# Patient Record
Sex: MALE | Race: WHITE | Employment: FULL TIME | URBAN - METROPOLITAN AREA
[De-identification: names, ages, dates, MRNs, and addresses within clinical notes are randomized per-mention and may not be internally consistent; named-entity substitution may affect disease eponyms.]

---

## 2017-10-25 ENCOUNTER — ANESTHESIA EVENT (OUTPATIENT)
Dept: OPERATING ROOM | Age: 37
End: 2017-10-25
Payer: COMMERCIAL

## 2017-10-26 ENCOUNTER — ANESTHESIA (OUTPATIENT)
Dept: OPERATING ROOM | Age: 37
End: 2017-10-26
Payer: COMMERCIAL

## 2017-10-26 ENCOUNTER — HOSPITAL ENCOUNTER (OUTPATIENT)
Age: 37
Setting detail: OUTPATIENT SURGERY
Discharge: HOME | End: 2017-10-26
Attending: OPHTHALMOLOGY | Admitting: OPHTHALMOLOGY
Payer: COMMERCIAL

## 2017-10-26 VITALS — SYSTOLIC BLOOD PRESSURE: 122 MMHG | OXYGEN SATURATION: 98 % | DIASTOLIC BLOOD PRESSURE: 62 MMHG | TEMPERATURE: 97.5 F

## 2017-10-26 VITALS
WEIGHT: 199 LBS | BODY MASS INDEX: 28.49 KG/M2 | DIASTOLIC BLOOD PRESSURE: 72 MMHG | HEIGHT: 70 IN | SYSTOLIC BLOOD PRESSURE: 128 MMHG | OXYGEN SATURATION: 97 % | RESPIRATION RATE: 18 BRPM | HEART RATE: 71 BPM | TEMPERATURE: 97.9 F

## 2017-10-26 PROBLEM — H33.023 RETINAL DETACHMENT OF BOTH EYES WITH MULTIPLE BREAKS: Chronic | Status: ACTIVE | Noted: 2017-10-26

## 2017-10-26 PROCEDURE — 3700000000 HC ANESTHESIA ATTENDED CARE: Performed by: OPHTHALMOLOGY

## 2017-10-26 PROCEDURE — 7100000010 HC PHASE II RECOVERY - FIRST 15 MIN: Performed by: OPHTHALMOLOGY

## 2017-10-26 PROCEDURE — 2580000003 HC RX 258: Performed by: ANESTHESIOLOGY

## 2017-10-26 PROCEDURE — 2780000010 HC IMPLANT OTHER: Performed by: OPHTHALMOLOGY

## 2017-10-26 PROCEDURE — 7100000001 HC PACU RECOVERY - ADDTL 15 MIN: Performed by: OPHTHALMOLOGY

## 2017-10-26 PROCEDURE — 7100000000 HC PACU RECOVERY - FIRST 15 MIN: Performed by: OPHTHALMOLOGY

## 2017-10-26 PROCEDURE — 2500000003 HC RX 250 WO HCPCS: Performed by: OPHTHALMOLOGY

## 2017-10-26 PROCEDURE — 3600000014 HC SURGERY LEVEL 4 ADDTL 15MIN: Performed by: OPHTHALMOLOGY

## 2017-10-26 PROCEDURE — 6370000000 HC RX 637 (ALT 250 FOR IP): Performed by: OPHTHALMOLOGY

## 2017-10-26 PROCEDURE — 2500000003 HC RX 250 WO HCPCS: Performed by: NURSE ANESTHETIST, CERTIFIED REGISTERED

## 2017-10-26 PROCEDURE — 3600000004 HC SURGERY LEVEL 4 BASE: Performed by: OPHTHALMOLOGY

## 2017-10-26 PROCEDURE — 6360000002 HC RX W HCPCS: Performed by: NURSE ANESTHETIST, CERTIFIED REGISTERED

## 2017-10-26 PROCEDURE — 3700000001 HC ADD 15 MINUTES (ANESTHESIA): Performed by: OPHTHALMOLOGY

## 2017-10-26 PROCEDURE — 6360000002 HC RX W HCPCS: Performed by: OPHTHALMOLOGY

## 2017-10-26 DEVICE — BUCKLE SCLER RETINAL 2.5 MM EYE TIRE STYL 276: Type: IMPLANTABLE DEVICE | Status: FUNCTIONAL

## 2017-10-26 DEVICE — IMPLANTABLE DEVICE
Type: IMPLANTABLE DEVICE | Status: FUNCTIONAL
Brand: CIRCLING BAND

## 2017-10-26 DEVICE — SLEEVE SCLER BCKL L30IN OD2.4IN ID1.5IN SIL STYL 72: Type: IMPLANTABLE DEVICE | Status: FUNCTIONAL

## 2017-10-26 RX ORDER — ERYTHROMYCIN 5 MG/G
OINTMENT OPHTHALMIC PRN
Status: DISCONTINUED | OUTPATIENT
Start: 2017-10-26 | End: 2017-10-26 | Stop reason: HOSPADM

## 2017-10-26 RX ORDER — GENTAMICIN SULFATE 40 MG/ML
INJECTION, SOLUTION INTRAMUSCULAR; INTRAVENOUS PRN
Status: DISCONTINUED | OUTPATIENT
Start: 2017-10-26 | End: 2017-10-26 | Stop reason: HOSPADM

## 2017-10-26 RX ORDER — LIDOCAINE HYDROCHLORIDE 10 MG/ML
INJECTION, SOLUTION EPIDURAL; INFILTRATION; INTRACAUDAL; PERINEURAL PRN
Status: DISCONTINUED | OUTPATIENT
Start: 2017-10-26 | End: 2017-10-26 | Stop reason: SDUPTHER

## 2017-10-26 RX ORDER — ONDANSETRON 2 MG/ML
INJECTION INTRAMUSCULAR; INTRAVENOUS PRN
Status: DISCONTINUED | OUTPATIENT
Start: 2017-10-26 | End: 2017-10-26 | Stop reason: SDUPTHER

## 2017-10-26 RX ORDER — DEXAMETHASONE SODIUM PHOSPHATE 10 MG/ML
INJECTION INTRAMUSCULAR; INTRAVENOUS PRN
Status: DISCONTINUED | OUTPATIENT
Start: 2017-10-26 | End: 2017-10-26 | Stop reason: SDUPTHER

## 2017-10-26 RX ORDER — GLYCOPYRROLATE 0.2 MG/ML
INJECTION INTRAMUSCULAR; INTRAVENOUS PRN
Status: DISCONTINUED | OUTPATIENT
Start: 2017-10-26 | End: 2017-10-26 | Stop reason: SDUPTHER

## 2017-10-26 RX ORDER — SODIUM CHLORIDE, SODIUM LACTATE, POTASSIUM CHLORIDE, CALCIUM CHLORIDE 600; 310; 30; 20 MG/100ML; MG/100ML; MG/100ML; MG/100ML
INJECTION, SOLUTION INTRAVENOUS CONTINUOUS
Status: DISCONTINUED | OUTPATIENT
Start: 2017-10-26 | End: 2017-10-26 | Stop reason: HOSPADM

## 2017-10-26 RX ORDER — CYCLOPENTOLATE HYDROCHLORIDE 10 MG/ML
1 SOLUTION/ DROPS OPHTHALMIC EVERY 5 MIN PRN
Status: DISCONTINUED | OUTPATIENT
Start: 2017-10-26 | End: 2017-10-26 | Stop reason: HOSPADM

## 2017-10-26 RX ORDER — PROPOFOL 10 MG/ML
INJECTION, EMULSION INTRAVENOUS PRN
Status: DISCONTINUED | OUTPATIENT
Start: 2017-10-26 | End: 2017-10-26 | Stop reason: SDUPTHER

## 2017-10-26 RX ORDER — MIDAZOLAM HYDROCHLORIDE 1 MG/ML
INJECTION INTRAMUSCULAR; INTRAVENOUS PRN
Status: DISCONTINUED | OUTPATIENT
Start: 2017-10-26 | End: 2017-10-26 | Stop reason: SDUPTHER

## 2017-10-26 RX ORDER — PHENYLEPHRINE HYDROCHLORIDE 100 MG/ML
1 SOLUTION/ DROPS OPHTHALMIC EVERY 5 MIN PRN
Status: DISCONTINUED | OUTPATIENT
Start: 2017-10-26 | End: 2017-10-26 | Stop reason: HOSPADM

## 2017-10-26 RX ORDER — ROCURONIUM BROMIDE 10 MG/ML
INJECTION, SOLUTION INTRAVENOUS PRN
Status: DISCONTINUED | OUTPATIENT
Start: 2017-10-26 | End: 2017-10-26 | Stop reason: SDUPTHER

## 2017-10-26 RX ORDER — FENTANYL CITRATE 50 UG/ML
INJECTION, SOLUTION INTRAMUSCULAR; INTRAVENOUS PRN
Status: DISCONTINUED | OUTPATIENT
Start: 2017-10-26 | End: 2017-10-26 | Stop reason: SDUPTHER

## 2017-10-26 RX ORDER — BUPIVACAINE HYDROCHLORIDE 7.5 MG/ML
INJECTION, SOLUTION EPIDURAL; RETROBULBAR PRN
Status: DISCONTINUED | OUTPATIENT
Start: 2017-10-26 | End: 2017-10-26 | Stop reason: HOSPADM

## 2017-10-26 RX ORDER — FENTANYL CITRATE 50 UG/ML
25 INJECTION, SOLUTION INTRAMUSCULAR; INTRAVENOUS EVERY 5 MIN PRN
Status: CANCELLED | OUTPATIENT
Start: 2017-10-26

## 2017-10-26 RX ORDER — TOBRAMYCIN AND DEXAMETHASONE 3; 1 MG/ML; MG/ML
1 SUSPENSION/ DROPS OPHTHALMIC ONCE
Status: DISCONTINUED | OUTPATIENT
Start: 2017-10-26 | End: 2017-10-26 | Stop reason: HOSPADM

## 2017-10-26 RX ORDER — ESMOLOL HYDROCHLORIDE 10 MG/ML
INJECTION INTRAVENOUS PRN
Status: DISCONTINUED | OUTPATIENT
Start: 2017-10-26 | End: 2017-10-26 | Stop reason: SDUPTHER

## 2017-10-26 RX ORDER — GENTAMICIN SULFATE 3 MG/ML
SOLUTION/ DROPS OPHTHALMIC PRN
Status: DISCONTINUED | OUTPATIENT
Start: 2017-10-26 | End: 2017-10-26 | Stop reason: HOSPADM

## 2017-10-26 RX ADMIN — ESMOLOL HYDROCHLORIDE 20 MG: 10 INJECTION INTRAVENOUS at 12:25

## 2017-10-26 RX ADMIN — ONDANSETRON 4 MG: 2 INJECTION, SOLUTION INTRAMUSCULAR; INTRAVENOUS at 12:19

## 2017-10-26 RX ADMIN — LIDOCAINE HYDROCHLORIDE 50 MG: 10 INJECTION, SOLUTION EPIDURAL; INFILTRATION; INTRACAUDAL; PERINEURAL at 11:00

## 2017-10-26 RX ADMIN — DEXAMETHASONE SODIUM PHOSPHATE 10 MG: 10 INJECTION INTRAMUSCULAR; INTRAVENOUS at 11:10

## 2017-10-26 RX ADMIN — SODIUM CHLORIDE, POTASSIUM CHLORIDE, SODIUM LACTATE AND CALCIUM CHLORIDE: 600; 310; 30; 20 INJECTION, SOLUTION INTRAVENOUS at 10:56

## 2017-10-26 RX ADMIN — PHENYLEPHRINE HYDROCHLORIDE 1 DROP: 100 SOLUTION/ DROPS OPHTHALMIC at 10:01

## 2017-10-26 RX ADMIN — ONDANSETRON 4 MG: 2 INJECTION, SOLUTION INTRAMUSCULAR; INTRAVENOUS at 11:10

## 2017-10-26 RX ADMIN — PROPOFOL 200 MG: 10 INJECTION, EMULSION INTRAVENOUS at 11:00

## 2017-10-26 RX ADMIN — ROCURONIUM BROMIDE 40 MG: 10 INJECTION INTRAVENOUS at 11:00

## 2017-10-26 RX ADMIN — MIDAZOLAM HYDROCHLORIDE 1 MG: 1 INJECTION, SOLUTION INTRAMUSCULAR; INTRAVENOUS at 11:00

## 2017-10-26 RX ADMIN — CYCLOPENTOLATE HYDROCHLORIDE 1 DROP: 10 SOLUTION/ DROPS OPHTHALMIC at 09:53

## 2017-10-26 RX ADMIN — FENTANYL CITRATE 100 MCG: 50 INJECTION INTRAMUSCULAR; INTRAVENOUS at 11:00

## 2017-10-26 RX ADMIN — CYCLOPENTOLATE HYDROCHLORIDE 1 DROP: 10 SOLUTION/ DROPS OPHTHALMIC at 10:01

## 2017-10-26 RX ADMIN — GLYCOPYRROLATE 0.4 MG: 0.2 INJECTION, SOLUTION INTRAMUSCULAR; INTRAVENOUS at 12:16

## 2017-10-26 RX ADMIN — PHENYLEPHRINE HYDROCHLORIDE 1 DROP: 100 SOLUTION/ DROPS OPHTHALMIC at 09:53

## 2017-10-26 RX ADMIN — NEOSTIGMINE METHYLSULFATE 3 MG: 1 INJECTION, SOLUTION INTRAMUSCULAR; INTRAVENOUS; SUBCUTANEOUS at 12:16

## 2017-10-26 RX ADMIN — MIDAZOLAM HYDROCHLORIDE 1 MG: 1 INJECTION, SOLUTION INTRAMUSCULAR; INTRAVENOUS at 10:56

## 2017-10-26 ASSESSMENT — PAIN DESCRIPTION - PAIN TYPE
TYPE: SURGICAL PAIN

## 2017-10-26 ASSESSMENT — PAIN DESCRIPTION - ORIENTATION
ORIENTATION: RIGHT

## 2017-10-26 ASSESSMENT — PAIN SCALES - WONG BAKER
WONGBAKER_NUMERICALRESPONSE: 0

## 2017-10-26 ASSESSMENT — PAIN DESCRIPTION - LOCATION
LOCATION: EYE

## 2017-10-26 ASSESSMENT — PAIN - FUNCTIONAL ASSESSMENT
PAIN_FUNCTIONAL_ASSESSMENT: 0-10
PAIN_FUNCTIONAL_ASSESSMENT: 0-10

## 2017-10-26 ASSESSMENT — PAIN SCALES - GENERAL
PAINLEVEL_OUTOF10: 2

## 2017-10-26 NOTE — PROGRESS NOTES
1330 wife at bedside. 1340 discharge instructions reviewed. All questions answered.  Wife verbalizes understanding

## 2017-10-26 NOTE — ANESTHESIA PRE PROCEDURE
Department of Anesthesiology  Preprocedure Note       Name:  Shivam Drake   Age:  44 y.o.  :  1980                                          MRN:  [id number]         Date:  10/26/2017      Surgeon: Estefani Jones):  Tisha Ahumada MD    Procedure: Procedure(s):  SCLERAL BUCKLE RIGHT EYE, INDIRECT LASER LEFT EYE    Medications prior to admission:   Prior to Admission medications    Not on File       Current medications:    No current facility-administered medications for this encounter. Allergies: Allergies not on file    Problem List:  There is no problem list on file for this patient. Past Medical History:  No past medical history on file. Past Surgical History:  No past surgical history on file. Social History:    Social History   Substance Use Topics    Smoking status: Not on file    Smokeless tobacco: Not on file    Alcohol use Not on file                                Counseling given: Not Answered      Vital Signs (Current): There were no vitals filed for this visit. BP Readings from Last 3 Encounters:   No data found for BP       NPO Status:                                                                                 BMI:   Wt Readings from Last 3 Encounters:   No data found for Wt     There is no height or weight on file to calculate BMI.    CBC: No results found for: WBC, RBC, HGB, HCT, MCV, RDW, PLT    CMP: No results found for: NA, K, CL, CO2, BUN, CREATININE, GFRAA, AGRATIO, LABGLOM, GLUCOSE, PROT, CALCIUM, BILITOT, ALKPHOS, AST, ALT    POC Tests: No results for input(s): POCGLU, POCNA, POCK, POCCL, POCBUN, POCHEMO, POCHCT in the last 72 hours.     Coags: No results found for: PROTIME, INR, APTT    HCG (If Applicable): No results found for: PREGTESTUR, PREGSERUM, HCG, HCGQUANT     ABGs: No results found for: PHART, PO2ART, PCT2FNU, HSL4HSG, BEART, B8XGEYLD     Type & Screen (If Applicable):  No results found for: LABABO, [address]    Anesthesia Evaluation  Patient summary reviewed and Nursing notes reviewed no history of anesthetic complications:   Airway: Mallampati: II  TM distance: >3 FB   Neck ROM: full  Mouth opening: > = 3 FB Dental: normal exam         Pulmonary:negative ROS and normal exam                               Cardiovascular:  Exercise tolerance: good (>4 METS),       (-) past MI, CAD, CABG/stent and dysrhythmias                Neuro/Psych:   {neg ROS              GI/Hepatic/Renal:   (+) GERD: well controlled,      (-) liver disease       Endo/Other:        (-) hypothyroidism, hyperthyroidism, blood dyscrasia, arthritis, no Type II DM               Abdominal:           Vascular:                                    Anesthesia Plan      general     ASA 2     (GETA)  Induction: intravenous. MIPS: Prophylactic antiemetics administered. Anesthetic plan and risks discussed with patient. Plan discussed with CRNA.                 Arash Hylton MD   10/26/2017

## 2017-10-26 NOTE — BRIEF OP NOTE
Brief Postoperative Note  ______________________________________________________________    Patient: Cristine Ott  YOB: 1980  MRN: [id number]  Date of Procedure: 10/26/2017    Pre-Op Diagnosis: RETINAL DETACHMENT Both Eyes    Post-Op Diagnosis: Same       Procedure(s):  SCLERAL BUCKLE,CRYO - RIGHT EYE; INDIRECT LASER - LEFT EYE    Anesthesia: General    Surgeon(s):  Mari Ty MD    Staff:  Scrub Person First: George Ko     Estimated Blood Loss: 5 ml)    Complications:    Specimens:       Implants:    Implant Name Type Inv.  Item Serial No.  Lot No. LRB No. Used   IMPL EYE TIRE SCLERAL BCKL 276 2.5MM Eye IMPL EYE TIRE SCLERAL BCKL 276 2.5MM  [employer] [id number] N/A 1   IMPL EYE SLV SCLERAL BCKL TEDDY 72 2.4MM Eye IMPL EYE SLV SCLERAL BCKL TEDDY 72 2.4MM  [facility] [employer] [place] [id number] N/A 1   IMPL EYE BND SCLERAL BCKL  2.5MM Eye IMPL EYE BND SCLERAL BCKL  2.5MM   [employer] [id number] N/A 1         Drains:      Findings:     Mari Ty MD  Date: 10/26/2017  Time: 12:47 PM

## 2017-10-26 NOTE — PROGRESS NOTES
[id number] discharged in w/c with all personal items. wifef has instructions and eye kit.  No prescriptions

## 2017-10-27 NOTE — OP NOTE
[address] [facility]                               OPERATIVE REPORT    PATIENT NAME: Torey Watkins                        :         1980  MED REC NO:   [id number]                             ROOM:  ACCOUNT NO:   [de-identified]                           ADMIT DATE:  10/26/2017  PROVIDER:     Pritesh Wang    DATE OF PROCEDURE:  10/26/2017    PREOPERATIVE DIAGNOSES:  1. Rhegmatogenous retinal detachment with macula off, right eye.  2.  Subclinical retinal detachments, left eye. POSTOPERATIVE DIAGNOSES:  1. Rhegmatogenous retinal detachment with macula off, right eye.  2.  Subclinical retinal detachments, left eye. PROCEDURE:  1.  Scleral buckle, right eye.  2.  Indirect laser to surround subclinical retinal detachments, left  eye. ANESTHESIA:  General endotracheal.    REASON FOR OPERATION:  The patient is a 59-year-old gentleman who  presented with several-day history of vision loss in the right eye. He was found to have a macula off retinal detachment. There were  signs of chronic superonasal peripheral detachment with a round hole. There was localized detachment with round holes inferior temporally  and a small on inferior nasally. The left eye has a round hole with  limited detachment. The patient will have a scleral buckle in the  right eye to reattach the retina and to recover as much vision as  possible. Left eye will have indirect laser to try to prevent future  retinal detachments. He understands risks include, but are not  limited to bleeding, infection, and recurrent retinal detachments as  well as cataract formation. PROCEDURE:  The patient was brought to the operating room in good  condition. He _____ general anesthesia and was prepped and draped in  the usual fashion. An indirect laser was placed around retinal holes  and localized detachment in the left eye.   He was then prepped and  draped in the usual fashion. A 360-degree conjunctival peritomy was  done at the limbus with relaxing incisions at 3 and 9. Each rectus  muscle isolated on 4-0 black silk traction suture. Indirect  ophthalmoscopy identified and marked the retinal holes. They were  treated with cryopexy. A #240 band was placed around the eye and tied  with a Watzke sleeve in the middle of the superotemporal quadrant. A  15 mm piece of 276 explant was cut and placed beneath the band  superonasally. A 5-0 nylon mattress suture was placed but not tied  over the area of the hole in the superior nasal quadrant. The band  was sutured at 04:30, 07:30 and 10:30. The sutures were placed, so  that the band ran directly over the marks for the round holes  inferiorly. The drain site was selected just posterior to the hole. Sclera was incised down to bare choroid. The choroid inspected and  perforated with diathermy pin. Spontaneous drainage of subretinal  fluid ensued. This allowed to drain until spontaneously seized. The  preplaced 5-0 nylon suture was then pulled up and tied. The band was  pulled up rather 1.5 mL of air was injected to the pars plana. The  band was pulled up to yield a mild band effect. Ends of the band were  trimmed. Indirect ophthalmoscopy showed the buckle was in good  position. There was no significant fluid superiorly. I expect the  inferior detachment to flatten on their own. The subtenon area was  irrigated with gentamicin and later Marcaine. Conjunctiva was closed  with interrupted 7-0 Vicryl. The eye was patched in the usual  fashion. The patient taken to recovery room in good condition. Vera Kelley Cea    D: 10/26/2017 12:50:27       T: 10/26/2017 13:08:35     [email or web address]  Job#: [id number]     Doc#: [id number]

## 2018-03-01 NOTE — OP NOTE
[address] [facility]                                 OPERATIVE REPORT    PATIENT NAME: Tashia Bill                        :        1980  MED REC NO:   [id number]                             ROOM:  ACCOUNT NO:   [de-identified]                           ADMIT DATE: 10/26/2017  PROVIDER:     Angela Wick    DATE OF PROCEDURE:  10/26/2017    Redictation for insurance purposes. PREOPERATIVE DIAGNOSIS:  1. Rhegmatogenous retinal detachment with macula off, right eye.  2.  Subclinical retinal detachments, left eye    POSTOPERATIVE DIAGNOSES  1. Rhegmatogenous retinal detachment with macula off, right eye.  2.  Subclinical retinal detachments, left eye    PROCEDURE  1. Scleral buckle, right eye.  2.  Indirect laser to surround subclinical retinal detachments, left eye. ANESTHESIA:  General endotracheal.    REASON FOR OPERATION:  The patient is a 50-year-old gentleman who presented  with a several-day history of vision loss in the right eye. He was found  to have a macula-off retinal detachment. There were signs of chronic  superior nasal peripheral detachment with a round hole. There was  localized detachment with a round holes inferior temporally and a small  area of detachment inferonasally. Left eye has a round hole with limited detachment which does not approach  the macula at this time. The patient will have a scleral buckle in the right eye under general  anesthesia to reattach the retina and to recover as much vision as  possible. The left eye will have indirect laser to try to prevent future retinal  detachment enlarging. He understands the risks include but are not limited  to bleeding, infection, and recurrent retinal detachment as well as  cataract formation. PROCEDURE:  The patient was brought to the operating room in good  condition.  The retrobulbar anesthetic was given in the right eye for his  scleral buckle. The left eye was treated with indirect laser while he was under general  anesthesia. There were three rows of laser placed around all areas of  localized retinal detachment. No complications or bleeding occurred. The right eye was then prepped and draped in the usual fashion. A 360  degrees conjunctival peritomy was done at the limbus with relaxing  incisions at 3 and 9. Each rectus muscle isolated on 4-0 black silk  traction sutures. Indirect ophthalmoscopy identified and marked the  retinal holes. The holes were treated with cryopexy. A number 240 band  was placed around the eye and tied with a Watzke sleeve in the middle of  the superior temporal quadrant. A 15-mm piece of 276 explant was cut and  placed beneath the band superonasally. A 5-0 nylon mattress suture was  placed but not tied over the area of hole in the superonasal quadrant. The  band was sutured at 04:30, 07:30 and 10:30. The sutures were placed so  that the band ran directly over the marks for the round holes inferiorly. A drain site was selected just posterior to the hole. Sclera was incised  down to bare choroid. The choroid inspected and perforated with a  diathermy pin. Spontaneous drainage of subretinal fluid ensued. This was  allowed to drain until it spontaneously ceased. The preplaced 5-0 nylon  suture was then pulled up and tied. The band was pulled up to yield a mild  band effect. The eye was still somewhat soft. A 1.5 mL of the air was  injected through the pars plana. The ends of the band were trimmed. Indirect ophthalmoscopy showed the buckle was in good position. There was  no significant fluid superiorly. I expect the inferior detachments to  flatten on their own. The subtenons area was irrigated with gentamicin and  later Marcaine. Conjunctiva was closed with interrupted 7-0 Vicryl. The  eye was patched in the usual fashion.   The patient was taken to recovery  room in good condition. Naranjomerary Bateman Priti    Redictation:  D: 02/28/2018 18:19:33       T: 02/28/2018 18:35:11     [email or web address]  Job#: [id number]     Doc#: [id number]    CC:  Eric Smith

## (undated) DEVICE — COVER MAYO STAND CLTH

## (undated) DEVICE — GARMENT COMPR STD FOR 17IN CALF UNIF THER FLOTRN

## (undated) DEVICE — SUTURE ETHLN SZ 5-0 L18IN NONABSORBABLE BLK S-14 L8MM 1/4 7731G

## (undated) DEVICE — Z CONVERTED USE 2273647 APPLICATOR COT TIP 3IN

## (undated) DEVICE — BLADE OPHTH MULTISIDED SHRP ALL ARND FOR GLAUCOMA RETIN

## (undated) DEVICE — 3M™ STERI-DRAPE™ MEDIUM DRAPE WITH APERTURE 1030: Brand: STERI-DRAPE™

## (undated) DEVICE — 1024 S-DRAPE APER POUCH 10/BX 4BBX/CS: Brand: STERI-DRAPE™

## (undated) DEVICE — SOLUTION IRRIG 1000ML PENTALYTE PLAS POUR BTL TIS U SOL

## (undated) DEVICE — SOLUTION SURG PREP POV IOD 7.5% 4 OZ

## (undated) DEVICE — COVER LT HNDL BLU PLAS

## (undated) DEVICE — 3 ML SYRINGE LUER-LOCK TIP: Brand: MONOJECT

## (undated) DEVICE — 1 ML TUBERCULIN SYRINGE LUER-LOCK TIP: Brand: MONOJECT

## (undated) DEVICE — PROBE BPLR 18GA 45DEG ANG BLNT TIP HEMSTAT ERAS WET-FIELD

## (undated) DEVICE — SKIN MARKER,FINE TIP: Brand: DEVON

## (undated) DEVICE — AGENT VISCOELASTIC 1ML 2% HYDROXYPROPYL METHCELL PRELD GLS

## (undated) DEVICE — Z DISCONTINUED NO SUB IDED SPONGE OPHTH EYE SPEAR SURG STRL

## (undated) DEVICE — STANDARD HYPODERMIC NEEDLE,ALUMINUM HUB: Brand: MONOJECT

## (undated) DEVICE — NEEDLE FLTR 18GA L1.5IN MEM THK5UM BLNT DISP

## (undated) DEVICE — OCCLUDER EYE POLYETH HYPOALRG ADH TAPE W/O PINHOLE

## (undated) DEVICE — PREP SOL PVP IODINE 4%  4 OZ/BTL

## (undated) DEVICE — NEEDLE HYPO 30GA L0.5IN BGE POLYPR HUB S STL REG BVL STR

## (undated) DEVICE — RETINA PK

## (undated) DEVICE — TOWEL,OR,DSP,ST,NATURAL,DLX,4/PK,20PK/CS: Brand: MEDLINE

## (undated) DEVICE — SUTURE VCRL SZ 7-0 L18IN ABSRB VLT L6.5MM TG140-8 3/8 CIR J546G